# Patient Record
Sex: FEMALE | Race: BLACK OR AFRICAN AMERICAN | Employment: FULL TIME | URBAN - METROPOLITAN AREA
[De-identification: names, ages, dates, MRNs, and addresses within clinical notes are randomized per-mention and may not be internally consistent; named-entity substitution may affect disease eponyms.]

---

## 2021-06-23 ENCOUNTER — INITIAL PRENATAL (OUTPATIENT)
Dept: OBGYN CLINIC | Facility: CLINIC | Age: 26
End: 2021-06-23
Payer: MEDICAID

## 2021-06-23 VITALS
DIASTOLIC BLOOD PRESSURE: 72 MMHG | HEIGHT: 67 IN | HEART RATE: 85 BPM | WEIGHT: 214 LBS | SYSTOLIC BLOOD PRESSURE: 108 MMHG | BODY MASS INDEX: 33.59 KG/M2

## 2021-06-23 DIAGNOSIS — O36.80X0 PREGNANCY WITH UNCERTAIN FETAL VIABILITY, SINGLE OR UNSPECIFIED FETUS: ICD-10-CM

## 2021-06-23 DIAGNOSIS — O36.80X0 PREGNANCY WITH INCONCLUSIVE FETAL VIABILITY, SINGLE OR UNSPECIFIED FETUS: Primary | ICD-10-CM

## 2021-06-23 DIAGNOSIS — Z86.718 HISTORY OF DVT (DEEP VEIN THROMBOSIS): ICD-10-CM

## 2021-06-23 DIAGNOSIS — Z34.01 ENCOUNTER FOR SUPERVISION OF NORMAL FIRST PREGNANCY IN FIRST TRIMESTER: Primary | ICD-10-CM

## 2021-06-23 PROCEDURE — 87591 N.GONORRHOEAE DNA AMP PROB: CPT | Performed by: OBSTETRICS & GYNECOLOGY

## 2021-06-23 PROCEDURE — 3078F DIAST BP <80 MM HG: CPT | Performed by: OBSTETRICS & GYNECOLOGY

## 2021-06-23 PROCEDURE — 87086 URINE CULTURE/COLONY COUNT: CPT | Performed by: OBSTETRICS & GYNECOLOGY

## 2021-06-23 PROCEDURE — 87491 CHLMYD TRACH DNA AMP PROBE: CPT | Performed by: OBSTETRICS & GYNECOLOGY

## 2021-06-23 PROCEDURE — 3008F BODY MASS INDEX DOCD: CPT | Performed by: OBSTETRICS & GYNECOLOGY

## 2021-06-23 PROCEDURE — 0500F INITIAL PRENATAL CARE VISIT: CPT | Performed by: OBSTETRICS & GYNECOLOGY

## 2021-06-23 PROCEDURE — 3074F SYST BP LT 130 MM HG: CPT | Performed by: OBSTETRICS & GYNECOLOGY

## 2021-06-23 RX ORDER — ENOXAPARIN SODIUM 100 MG/ML
40 INJECTION SUBCUTANEOUS DAILY
Qty: 30 EACH | Refills: 2 | Status: SHIPPED | OUTPATIENT
Start: 2021-06-23 | End: 2021-12-22

## 2021-06-23 NOTE — PROGRESS NOTES
Patient is here for her first OB appointment. She is a primigravida. No history of miscarriages. She had ultrasound done at outside facility which confirmed viability. Pictures did not show patient's name. We will have her repeat ultrasound here.   Tori Chavez

## 2021-06-25 NOTE — PROGRESS NOTES
Sent My Chart message with normal result. Farzaneh Velásquez,    Your chlamydia testing was negative.  Because of the sensitive nature of these tests, they are not results you will see in your MyChart results, but you are welcome to ask for a paper copy at your n

## 2021-07-24 ENCOUNTER — ROUTINE PRENATAL (OUTPATIENT)
Dept: OBGYN CLINIC | Facility: CLINIC | Age: 26
End: 2021-07-24
Payer: MEDICAID

## 2021-07-24 VITALS
HEIGHT: 67 IN | WEIGHT: 210 LBS | SYSTOLIC BLOOD PRESSURE: 110 MMHG | BODY MASS INDEX: 32.96 KG/M2 | DIASTOLIC BLOOD PRESSURE: 68 MMHG | HEART RATE: 72 BPM

## 2021-07-24 DIAGNOSIS — Z3A.14 14 WEEKS GESTATION OF PREGNANCY: Primary | ICD-10-CM

## 2021-07-24 DIAGNOSIS — Z86.718 HISTORY OF DVT (DEEP VEIN THROMBOSIS): ICD-10-CM

## 2021-07-24 LAB
GLUCOSE (URINE DIPSTICK): NEGATIVE MG/DL
MULTISTIX LOT#: NORMAL NUMERIC
PROTEIN (URINE DIPSTICK): NEGATIVE MG/DL

## 2021-07-24 PROCEDURE — 0502F SUBSEQUENT PRENATAL CARE: CPT | Performed by: OBSTETRICS & GYNECOLOGY

## 2021-07-24 PROCEDURE — 3078F DIAST BP <80 MM HG: CPT | Performed by: OBSTETRICS & GYNECOLOGY

## 2021-07-24 PROCEDURE — 81002 URINALYSIS NONAUTO W/O SCOPE: CPT | Performed by: OBSTETRICS & GYNECOLOGY

## 2021-07-24 PROCEDURE — 3008F BODY MASS INDEX DOCD: CPT | Performed by: OBSTETRICS & GYNECOLOGY

## 2021-07-24 PROCEDURE — 3074F SYST BP LT 130 MM HG: CPT | Performed by: OBSTETRICS & GYNECOLOGY

## 2021-07-24 NOTE — PROGRESS NOTES
On lovenox. Wants NIPT and carrier. To return on Monday when nurse available to draw blood. Will have 18 to 20-week ultrasound done with MFM. Mayito Spicer  Referral placed

## 2021-07-26 ENCOUNTER — TELEPHONE (OUTPATIENT)
Dept: OBGYN CLINIC | Facility: CLINIC | Age: 26
End: 2021-07-26

## 2021-07-26 ENCOUNTER — DOCUMENTATION ONLY (OUTPATIENT)
Dept: OBGYN CLINIC | Facility: CLINIC | Age: 26
End: 2021-07-26

## 2021-07-26 NOTE — PROGRESS NOTES
Pt was here for a genetic screening blood test.  At the time of her blood draw she indicated she was having nausea and vomitting since the beginning of her pregnancy.   After conferring with Dr. Shakeel Dutta, patient was given information regarding the use of Uniso

## 2021-07-26 NOTE — TELEPHONE ENCOUNTER
[facility] patient's blood. Patient confirmed that the name and  on all 3 tubes were correct.

## 2021-07-29 ENCOUNTER — TELEPHONE (OUTPATIENT)
Dept: OBGYN CLINIC | Facility: CLINIC | Age: 26
End: 2021-07-29

## 2021-07-29 NOTE — TELEPHONE ENCOUNTER
Patient needs PA for appointment with MFM. Routed to clinical supervisor to place PA. Patient notified. Verbalized understanding.  Will call patient once we have approval.

## 2021-08-02 LAB
AMB EXT MYRIAD TRISOMY 13: NEGATIVE
AMB EXT MYRIAD TRISOMY 18: NEGATIVE
AMB EXT MYRIAD TRISOMY 21: NEGATIVE

## 2021-08-02 NOTE — TELEPHONE ENCOUNTER
Spoke with patient, notified that evaluation request and records have been faxed to the [facility]-Dr. Agata Shirley. Advised that patient call tomorrow for appointment. Telephone number and office hours provided to patient.  Patient verbalized

## 2021-08-09 ENCOUNTER — TELEPHONE (OUTPATIENT)
Dept: OBGYN CLINIC | Facility: CLINIC | Age: 26
End: 2021-08-09

## 2021-08-09 NOTE — TELEPHONE ENCOUNTER
Pt aware of negative results. Carlin rayo RN to print and place in envelope at  for . Records abstracted, [place], RN to place in [address] [place] [address] for review and signature.

## 2021-08-10 LAB
AMB EXT CYSTIC FIBROSIS ALLELE 1: NEGATIVE
AMB EXT SICKLE CELL SOLUBILITY: NEGATIVE

## 2021-08-11 NOTE — TELEPHONE ENCOUNTER
Unaware if patient picked up results, not in file in drawer.   Will forward to [facility] for possible answer

## 2021-08-13 ENCOUNTER — TELEPHONE (OUTPATIENT)
Dept: OBGYN CLINIC | Facility: CLINIC | Age: 26
End: 2021-08-13

## 2021-08-13 NOTE — TELEPHONE ENCOUNTER
Carrier screen abstracted. Patient notified of results. Saliva kit order entered and sent to . Placed in providers box for review.

## 2021-08-21 ENCOUNTER — ROUTINE PRENATAL (OUTPATIENT)
Dept: OBGYN CLINIC | Facility: CLINIC | Age: 26
End: 2021-08-21
Payer: MEDICAID

## 2021-08-21 VITALS
HEIGHT: 67 IN | BODY MASS INDEX: 32.8 KG/M2 | DIASTOLIC BLOOD PRESSURE: 52 MMHG | HEART RATE: 105 BPM | SYSTOLIC BLOOD PRESSURE: 104 MMHG | WEIGHT: 209 LBS

## 2021-08-21 DIAGNOSIS — Z34.02 ENCOUNTER FOR SUPERVISION OF NORMAL FIRST PREGNANCY IN SECOND TRIMESTER: Primary | ICD-10-CM

## 2021-08-21 DIAGNOSIS — Z36.9 ENCOUNTER FOR ANTENATAL SCREENING OF MOTHER: ICD-10-CM

## 2021-08-21 LAB
GLUCOSE (URINE DIPSTICK): NEGATIVE MG/DL
MULTISTIX LOT#: ABNORMAL NUMERIC
PROTEIN (URINE DIPSTICK): 30 MG/DL

## 2021-08-21 PROCEDURE — 3008F BODY MASS INDEX DOCD: CPT | Performed by: OBSTETRICS & GYNECOLOGY

## 2021-08-21 PROCEDURE — 3074F SYST BP LT 130 MM HG: CPT | Performed by: OBSTETRICS & GYNECOLOGY

## 2021-08-21 PROCEDURE — 3078F DIAST BP <80 MM HG: CPT | Performed by: OBSTETRICS & GYNECOLOGY

## 2021-08-21 PROCEDURE — 81002 URINALYSIS NONAUTO W/O SCOPE: CPT | Performed by: OBSTETRICS & GYNECOLOGY

## 2021-08-21 PROCEDURE — 0502F SUBSEQUENT PRENATAL CARE: CPT | Performed by: OBSTETRICS & GYNECOLOGY

## 2021-08-21 NOTE — PROGRESS NOTES
Has appointment to see MFM early September. Taking Lovenox. Patient has not had blood work done yet. Encouraged to get her first OB panel ASAP. Return to office in 4 weeks.

## 2021-08-30 ENCOUNTER — TELEPHONE (OUTPATIENT)
Dept: OBGYN CLINIC | Facility: CLINIC | Age: 26
End: 2021-08-30

## 2021-08-30 NOTE — TELEPHONE ENCOUNTER
Spoke with patient, information give for Dr. Ashu Ellsworth with [address] [place] [address]. Patient verbalized understanding.

## 2021-08-30 NOTE — TELEPHONE ENCOUNTER
Patient stated she was told to go to [facility] for Angelique Snider. Not sure where she should go.  Please calll

## 2021-08-31 ENCOUNTER — LAB ENCOUNTER (OUTPATIENT)
Dept: LAB | Age: 26
End: 2021-08-31
Attending: OBSTETRICS & GYNECOLOGY
Payer: MEDICAID

## 2021-08-31 DIAGNOSIS — O36.80X0 PREGNANCY WITH UNCERTAIN FETAL VIABILITY, SINGLE OR UNSPECIFIED FETUS: ICD-10-CM

## 2021-08-31 LAB
ANTIBODY SCREEN: NEGATIVE
BASOPHILS # BLD AUTO: 0.03 X10(3) UL (ref 0–0.2)
BASOPHILS NFR BLD AUTO: 0.3 %
EOSINOPHIL # BLD AUTO: 0.14 X10(3) UL (ref 0–0.7)
EOSINOPHIL NFR BLD AUTO: 1.5 %
ERYTHROCYTE [DISTWIDTH] IN BLOOD BY AUTOMATED COUNT: 14.9 %
HBV SURFACE AG SER-ACNC: <0.1 [IU]/L
HBV SURFACE AG SERPL QL IA: NONREACTIVE
HCT VFR BLD AUTO: 31.5 %
HGB BLD-MCNC: 10 G/DL
IMM GRANULOCYTES # BLD AUTO: 0.05 X10(3) UL (ref 0–1)
IMM GRANULOCYTES NFR BLD: 0.5 %
LYMPHOCYTES # BLD AUTO: 2.44 X10(3) UL (ref 1–4)
LYMPHOCYTES NFR BLD AUTO: 25.7 %
MCH RBC QN AUTO: 30.3 PG (ref 26–34)
MCHC RBC AUTO-ENTMCNC: 31.7 G/DL (ref 31–37)
MCV RBC AUTO: 95.5 FL
MONOCYTES # BLD AUTO: 0.56 X10(3) UL (ref 0.1–1)
MONOCYTES NFR BLD AUTO: 5.9 %
NEUTROPHILS # BLD AUTO: 6.26 X10 (3) UL (ref 1.5–7.7)
NEUTROPHILS # BLD AUTO: 6.26 X10(3) UL (ref 1.5–7.7)
NEUTROPHILS NFR BLD AUTO: 66.1 %
PLATELET # BLD AUTO: 251 10(3)UL (ref 150–450)
RBC # BLD AUTO: 3.3 X10(6)UL
RH BLOOD TYPE: POSITIVE
RUBV IGG SER QL: POSITIVE
RUBV IGG SER-ACNC: 58.8 IU/ML (ref 10–?)
T PALLIDUM AB SER QL IA: NONREACTIVE
WBC # BLD AUTO: 9.5 X10(3) UL (ref 4–11)

## 2021-08-31 PROCEDURE — 86900 BLOOD TYPING SEROLOGIC ABO: CPT

## 2021-08-31 PROCEDURE — 87389 HIV-1 AG W/HIV-1&-2 AB AG IA: CPT

## 2021-08-31 PROCEDURE — 86901 BLOOD TYPING SEROLOGIC RH(D): CPT

## 2021-08-31 PROCEDURE — 85025 COMPLETE CBC W/AUTO DIFF WBC: CPT

## 2021-08-31 PROCEDURE — 36415 COLL VENOUS BLD VENIPUNCTURE: CPT

## 2021-08-31 PROCEDURE — 86762 RUBELLA ANTIBODY: CPT

## 2021-08-31 PROCEDURE — 86780 TREPONEMA PALLIDUM: CPT

## 2021-08-31 PROCEDURE — 87340 HEPATITIS B SURFACE AG IA: CPT

## 2021-08-31 PROCEDURE — 86850 RBC ANTIBODY SCREEN: CPT

## 2021-09-04 NOTE — PROGRESS NOTES
Farzaneh Zafar Eye,  Your test results are normal except you are anemic. Make sure you are taking prenatal vitamin containing iron.  Add in extra oral iron daily at least 4 hours apart from prenatal vitamin (can buy over the counter)    Please call the office with

## 2021-09-08 NOTE — PROGRESS NOTES
09/02/2021-Detailed Anatomy Ultrasound:  Normal Level 2 US  Fetal measurements are consistent with assigned gestational age  Fetal activity is normal  There are no gross fetal anomalies seen today  There are no ultrasound markers of aneuploidy detected  Ma

## 2021-09-18 ENCOUNTER — ROUTINE PRENATAL (OUTPATIENT)
Dept: OBGYN CLINIC | Facility: CLINIC | Age: 26
End: 2021-09-18
Payer: MEDICAID

## 2021-09-18 VITALS
WEIGHT: 217 LBS | DIASTOLIC BLOOD PRESSURE: 62 MMHG | SYSTOLIC BLOOD PRESSURE: 112 MMHG | HEIGHT: 67 IN | BODY MASS INDEX: 34.06 KG/M2 | HEART RATE: 116 BPM

## 2021-09-18 DIAGNOSIS — Z34.02 ENCOUNTER FOR SUPERVISION OF NORMAL FIRST PREGNANCY IN SECOND TRIMESTER: Primary | ICD-10-CM

## 2021-09-18 PROCEDURE — 81002 URINALYSIS NONAUTO W/O SCOPE: CPT | Performed by: OBSTETRICS & GYNECOLOGY

## 2021-09-18 PROCEDURE — 3074F SYST BP LT 130 MM HG: CPT | Performed by: OBSTETRICS & GYNECOLOGY

## 2021-09-18 PROCEDURE — 3078F DIAST BP <80 MM HG: CPT | Performed by: OBSTETRICS & GYNECOLOGY

## 2021-09-18 PROCEDURE — 3008F BODY MASS INDEX DOCD: CPT | Performed by: OBSTETRICS & GYNECOLOGY

## 2021-09-18 PROCEDURE — 0502F SUBSEQUENT PRENATAL CARE: CPT | Performed by: OBSTETRICS & GYNECOLOGY

## 2021-09-18 NOTE — PROGRESS NOTES
Fetus is moving. Glucola and CBC at 24 to 28 weeks. On Lovenox. Getting pain at injection site.   Return to office in 4 weeks

## 2021-09-23 ENCOUNTER — TELEPHONE (OUTPATIENT)
Dept: OBGYN CLINIC | Facility: CLINIC | Age: 26
End: 2021-09-23

## 2021-09-23 ENCOUNTER — HOSPITAL ENCOUNTER (OUTPATIENT)
Facility: HOSPITAL | Age: 26
Setting detail: OBSERVATION
Discharge: HOME | End: 2021-09-23
Attending: OBSTETRICS & GYNECOLOGY | Admitting: OBSTETRICS & GYNECOLOGY
Payer: MEDICAID

## 2021-09-23 VITALS
BODY MASS INDEX: 37.05 KG/M2 | TEMPERATURE: 98 F | RESPIRATION RATE: 16 BRPM | DIASTOLIC BLOOD PRESSURE: 57 MMHG | WEIGHT: 217 LBS | SYSTOLIC BLOOD PRESSURE: 117 MMHG | HEART RATE: 85 BPM | HEIGHT: 64 IN

## 2021-09-23 PROBLEM — Z34.90 PREGNANCY: Status: ACTIVE | Noted: 2021-09-23

## 2021-09-23 LAB
BILIRUBIN URINE: NEGATIVE
CONTROL RUN WITHIN 24 HOURS?: YES
GLUCOSE URINE: NEGATIVE
KETONE URINE: 15
LEUKOCYTE ESTERASE URINE: NEGATIVE
NITRITE URINE: NEGATIVE
PH URINE: 6 (ref 5–8)
PROTEIN URINE: NEGATIVE
SPEC GRAVITY: 1.02 (ref 1–1.03)
URINE CLARITY: CLEAR
URINE COLOR: YELLOW
UROBILINOGEN URINE: 0.2

## 2021-09-23 PROCEDURE — 99213 OFFICE O/P EST LOW 20 MIN: CPT

## 2021-09-23 PROCEDURE — 81002 URINALYSIS NONAUTO W/O SCOPE: CPT

## 2021-09-23 RX ORDER — FERROUS SULFATE 300 MG/5ML
300 LIQUID (ML) ORAL DAILY
Status: ON HOLD | COMMUNITY
End: 2022-01-22 | Stop reason: CLARIF

## 2021-09-23 NOTE — TELEPHONE ENCOUNTER
Patient calling to report vaginal pressure x2 days and contractions. Patient declines vaginal bleeding or leaking of fluid. Patient instructed to go to Labor and Delivery for evaluation. Patient verbalizes understanding.

## 2021-09-23 NOTE — PROGRESS NOTES
Pt here with complaints of lower abdominal pain for past 2 days rating the discomfort from 3-7 at different times. Pt appears comfortable ambulating to room. Triage process explained and pt verbalized understanding.

## 2021-09-23 NOTE — PROGRESS NOTES
Pt given verbal and written discharge instructions and verbalized understanding. Pt home ambulatory in no apparent discomfort after pitcher of water was given.

## 2021-09-23 NOTE — NST
Nonstress Test   Patient: Denia Jordan    Gestation: 23w0d    NST:       Variability: (!) Minimal                                    Baseline: 150 BPM                       Contractions: Not present                                                    N

## 2021-10-02 ENCOUNTER — LAB ENCOUNTER (OUTPATIENT)
Dept: LAB | Facility: HOSPITAL | Age: 26
End: 2021-10-02
Attending: OBSTETRICS & GYNECOLOGY
Payer: MEDICAID

## 2021-10-02 DIAGNOSIS — Z34.02 ENCOUNTER FOR SUPERVISION OF NORMAL FIRST PREGNANCY IN SECOND TRIMESTER: ICD-10-CM

## 2021-10-02 PROCEDURE — 85025 COMPLETE CBC W/AUTO DIFF WBC: CPT

## 2021-10-02 PROCEDURE — 36415 COLL VENOUS BLD VENIPUNCTURE: CPT

## 2021-10-02 PROCEDURE — 82950 GLUCOSE TEST: CPT

## 2021-10-04 NOTE — PROGRESS NOTES
Patient aware of blood test results and recommendation to start 1-2 oral iron tablets/day, 4 hours apart from her prenatal vitamin and to also get a stool softener due to increased risk of constipation with the iron. Patient verbalizes understanding.

## 2021-10-14 ENCOUNTER — TELEPHONE (OUTPATIENT)
Dept: OBGYN CLINIC | Facility: CLINIC | Age: 26
End: 2021-10-14

## 2021-10-14 ENCOUNTER — ROUTINE PRENATAL (OUTPATIENT)
Dept: OBGYN CLINIC | Facility: CLINIC | Age: 26
End: 2021-10-14
Payer: MEDICAID

## 2021-10-14 VITALS
BODY MASS INDEX: 34.37 KG/M2 | HEIGHT: 67 IN | DIASTOLIC BLOOD PRESSURE: 63 MMHG | SYSTOLIC BLOOD PRESSURE: 110 MMHG | WEIGHT: 219 LBS | HEART RATE: 79 BPM

## 2021-10-14 DIAGNOSIS — Z34.03 ENCOUNTER FOR SUPERVISION OF NORMAL FIRST PREGNANCY IN THIRD TRIMESTER: Primary | ICD-10-CM

## 2021-10-14 PROCEDURE — 3074F SYST BP LT 130 MM HG: CPT | Performed by: OBSTETRICS & GYNECOLOGY

## 2021-10-14 PROCEDURE — 0502F SUBSEQUENT PRENATAL CARE: CPT | Performed by: OBSTETRICS & GYNECOLOGY

## 2021-10-14 PROCEDURE — 3008F BODY MASS INDEX DOCD: CPT | Performed by: OBSTETRICS & GYNECOLOGY

## 2021-10-14 PROCEDURE — 3078F DIAST BP <80 MM HG: CPT | Performed by: OBSTETRICS & GYNECOLOGY

## 2021-10-22 ENCOUNTER — TELEPHONE (OUTPATIENT)
Dept: OBGYN CLINIC | Facility: CLINIC | Age: 26
End: 2021-10-22

## 2021-10-22 NOTE — TELEPHONE ENCOUNTER
Per [employer] pt needs quantity limit PA, will fax form. Pt advised we will get PA going. Understanding verbalized.

## 2021-10-25 NOTE — TELEPHONE ENCOUNTER
PA Approved  Case # [id number]  Effective 8/1/21 until 10/25/2022    LM for patient to contact pharmacy for medication

## 2021-10-25 NOTE — TELEPHONE ENCOUNTER
Fax received from [employer] to verify patient's last name, listed with insurance company as [employer]. Spoke to patient, \"Deven\" is her  name. Paperwork re-faxed to [employer].

## 2021-10-29 NOTE — TELEPHONE ENCOUNTER
Copy of [facility] paperwork provided to patient and faxed to [id number] per patient's request.

## 2021-10-29 NOTE — TELEPHONE ENCOUNTER
FMLA paperwork signed per provider, LM for patient to call office back.  Patient needs to provide fax number and sign consent so we can fax paperwork

## 2021-11-03 ENCOUNTER — ROUTINE PRENATAL (OUTPATIENT)
Dept: OBGYN CLINIC | Facility: CLINIC | Age: 26
End: 2021-11-03
Payer: MEDICAID

## 2021-11-03 VITALS
WEIGHT: 220 LBS | HEART RATE: 74 BPM | SYSTOLIC BLOOD PRESSURE: 124 MMHG | DIASTOLIC BLOOD PRESSURE: 78 MMHG | HEIGHT: 67 IN | BODY MASS INDEX: 34.53 KG/M2

## 2021-11-03 DIAGNOSIS — Z3A.28 28 WEEKS GESTATION OF PREGNANCY: ICD-10-CM

## 2021-11-03 DIAGNOSIS — Z86.718 HISTORY OF DVT (DEEP VEIN THROMBOSIS): ICD-10-CM

## 2021-11-03 DIAGNOSIS — Z34.02 ENCOUNTER FOR SUPERVISION OF NORMAL FIRST PREGNANCY IN SECOND TRIMESTER: Primary | ICD-10-CM

## 2021-11-03 PROCEDURE — 3008F BODY MASS INDEX DOCD: CPT | Performed by: OBSTETRICS & GYNECOLOGY

## 2021-11-03 PROCEDURE — 90715 TDAP VACCINE 7 YRS/> IM: CPT | Performed by: OBSTETRICS & GYNECOLOGY

## 2021-11-03 PROCEDURE — 3074F SYST BP LT 130 MM HG: CPT | Performed by: OBSTETRICS & GYNECOLOGY

## 2021-11-03 PROCEDURE — 3078F DIAST BP <80 MM HG: CPT | Performed by: OBSTETRICS & GYNECOLOGY

## 2021-11-03 PROCEDURE — 90471 IMMUNIZATION ADMIN: CPT | Performed by: OBSTETRICS & GYNECOLOGY

## 2021-11-03 PROCEDURE — 0502F SUBSEQUENT PRENATAL CARE: CPT | Performed by: OBSTETRICS & GYNECOLOGY

## 2021-11-03 NOTE — PROGRESS NOTES
Hx of DVT. Jehovah witness. On Lovenox.   Had structural anatomy survey ultrasound with M at [facility]

## 2021-11-20 ENCOUNTER — ROUTINE PRENATAL (OUTPATIENT)
Dept: OBGYN CLINIC | Facility: CLINIC | Age: 26
End: 2021-11-20
Payer: MEDICAID

## 2021-11-20 VITALS
HEIGHT: 67 IN | HEART RATE: 88 BPM | BODY MASS INDEX: 34.53 KG/M2 | DIASTOLIC BLOOD PRESSURE: 75 MMHG | SYSTOLIC BLOOD PRESSURE: 118 MMHG | WEIGHT: 220 LBS

## 2021-11-20 DIAGNOSIS — O99.013 ANEMIA DURING PREGNANCY IN THIRD TRIMESTER: ICD-10-CM

## 2021-11-20 DIAGNOSIS — Z34.03 ENCOUNTER FOR SUPERVISION OF NORMAL FIRST PREGNANCY IN THIRD TRIMESTER: Primary | ICD-10-CM

## 2021-11-20 PROCEDURE — 3078F DIAST BP <80 MM HG: CPT | Performed by: OBSTETRICS & GYNECOLOGY

## 2021-11-20 PROCEDURE — 3074F SYST BP LT 130 MM HG: CPT | Performed by: OBSTETRICS & GYNECOLOGY

## 2021-11-20 PROCEDURE — 81002 URINALYSIS NONAUTO W/O SCOPE: CPT | Performed by: OBSTETRICS & GYNECOLOGY

## 2021-11-20 PROCEDURE — 3008F BODY MASS INDEX DOCD: CPT | Performed by: OBSTETRICS & GYNECOLOGY

## 2021-11-20 PROCEDURE — 0502F SUBSEQUENT PRENATAL CARE: CPT | Performed by: OBSTETRICS & GYNECOLOGY

## 2021-11-20 NOTE — PROGRESS NOTES
On Lovenox. Fetus active. Return to office in 2 weeks. Ova witness. Anemic. Taking iron. Increase to 2/day. Will repeat CBC if still anemic will do IV iron. Desire circumcision. Tdap.

## 2021-11-24 ENCOUNTER — LAB ENCOUNTER (OUTPATIENT)
Dept: LAB | Age: 26
End: 2021-11-24
Attending: OBSTETRICS & GYNECOLOGY
Payer: MEDICAID

## 2021-11-24 DIAGNOSIS — Z3A.28 28 WEEKS GESTATION OF PREGNANCY: ICD-10-CM

## 2021-11-24 DIAGNOSIS — O99.013 ANEMIA DURING PREGNANCY IN THIRD TRIMESTER: ICD-10-CM

## 2021-11-24 PROCEDURE — 87389 HIV-1 AG W/HIV-1&-2 AB AG IA: CPT

## 2021-11-24 PROCEDURE — 85025 COMPLETE CBC W/AUTO DIFF WBC: CPT

## 2021-11-26 NOTE — PROGRESS NOTES
Pt aware of results & recommendations. Advised can take Colace for constipation & drink plenty of water. Verbalized understanding.

## 2021-11-30 ENCOUNTER — TELEPHONE (OUTPATIENT)
Dept: OBGYN CLINIC | Facility: CLINIC | Age: 26
End: 2021-11-30

## 2021-11-30 NOTE — TELEPHONE ENCOUNTER
Patient is pregnant and needs to have dental xrays done .   Is this okay, and if it is, she needs a note for the dentist

## 2021-11-30 NOTE — TELEPHONE ENCOUNTER
I spoke with pt. I let her know she is ok to have dental x-rays done as long as her abdomin is double shielded. Letter sent to her Mychart to take to her dentist. Verbalized understanding.

## 2021-12-04 ENCOUNTER — ROUTINE PRENATAL (OUTPATIENT)
Dept: OBGYN CLINIC | Facility: CLINIC | Age: 26
End: 2021-12-04
Payer: MEDICAID

## 2021-12-04 VITALS
HEIGHT: 67 IN | HEART RATE: 94 BPM | SYSTOLIC BLOOD PRESSURE: 124 MMHG | BODY MASS INDEX: 35.06 KG/M2 | WEIGHT: 223.38 LBS | DIASTOLIC BLOOD PRESSURE: 75 MMHG

## 2021-12-04 DIAGNOSIS — Z34.03 ENCOUNTER FOR SUPERVISION OF NORMAL FIRST PREGNANCY IN THIRD TRIMESTER: Primary | ICD-10-CM

## 2021-12-04 PROCEDURE — 81002 URINALYSIS NONAUTO W/O SCOPE: CPT | Performed by: OBSTETRICS & GYNECOLOGY

## 2021-12-04 PROCEDURE — 3008F BODY MASS INDEX DOCD: CPT | Performed by: OBSTETRICS & GYNECOLOGY

## 2021-12-04 PROCEDURE — 3074F SYST BP LT 130 MM HG: CPT | Performed by: OBSTETRICS & GYNECOLOGY

## 2021-12-04 PROCEDURE — 3078F DIAST BP <80 MM HG: CPT | Performed by: OBSTETRICS & GYNECOLOGY

## 2021-12-04 PROCEDURE — 0502F SUBSEQUENT PRENATAL CARE: CPT | Performed by: OBSTETRICS & GYNECOLOGY

## 2021-12-04 NOTE — PROGRESS NOTES
Had ultrasound done at outside facility on November 7 and showed baby cephalic and a average in size at 3 pounds 6 ounces. Fetus active. Jehovah witness. On Lovenox. Will switch to heparin at 36 weeks.   Return to office in 2 weeks

## 2021-12-18 ENCOUNTER — ROUTINE PRENATAL (OUTPATIENT)
Dept: OBGYN CLINIC | Facility: CLINIC | Age: 26
End: 2021-12-18
Payer: MEDICAID

## 2021-12-18 VITALS
HEART RATE: 90 BPM | BODY MASS INDEX: 35.63 KG/M2 | HEIGHT: 67 IN | DIASTOLIC BLOOD PRESSURE: 71 MMHG | WEIGHT: 227 LBS | SYSTOLIC BLOOD PRESSURE: 110 MMHG

## 2021-12-18 DIAGNOSIS — Z34.03 ENCOUNTER FOR SUPERVISION OF NORMAL FIRST PREGNANCY IN THIRD TRIMESTER: ICD-10-CM

## 2021-12-18 DIAGNOSIS — O99.113 THROMBOPHILIA AFFECTING PREGNANCY IN THIRD TRIMESTER, ANTEPARTUM (HCC): Primary | ICD-10-CM

## 2021-12-18 DIAGNOSIS — D68.59 THROMBOPHILIA AFFECTING PREGNANCY IN THIRD TRIMESTER, ANTEPARTUM (HCC): Primary | ICD-10-CM

## 2021-12-18 PROCEDURE — 3078F DIAST BP <80 MM HG: CPT | Performed by: OBSTETRICS & GYNECOLOGY

## 2021-12-18 PROCEDURE — 3074F SYST BP LT 130 MM HG: CPT | Performed by: OBSTETRICS & GYNECOLOGY

## 2021-12-18 PROCEDURE — 0502F SUBSEQUENT PRENATAL CARE: CPT | Performed by: OBSTETRICS & GYNECOLOGY

## 2021-12-18 PROCEDURE — 81002 URINALYSIS NONAUTO W/O SCOPE: CPT | Performed by: OBSTETRICS & GYNECOLOGY

## 2021-12-18 PROCEDURE — 3008F BODY MASS INDEX DOCD: CPT | Performed by: OBSTETRICS & GYNECOLOGY

## 2021-12-18 NOTE — PROGRESS NOTES
Patient states that she stopped Lovenox on December 8. There was a recall on it and pharmacy would not be able to obtain. Will check on this issue on Monday. Fetus active. NST next week.   Unable to go Lovenox was switched to heparin

## 2021-12-21 RX ORDER — HEPARIN SODIUM 5000 [USP'U]/ML
5000 INJECTION, SOLUTION INTRAVENOUS; SUBCUTANEOUS 2 TIMES DAILY
Qty: 10 ML | Refills: 3 | Status: SHIPPED | OUTPATIENT
Start: 2021-12-21 | End: 2022-01-25

## 2021-12-21 NOTE — TELEPHONE ENCOUNTER
I spoke with pt. She has not been able to get her Lovenox since the beginning of the month due to a recall. She does not have a prescription for Heparin yet. She is to start that at 36 weeks which is in a couple days.  I let pt know I would get a message to

## 2021-12-22 ENCOUNTER — ROUTINE PRENATAL (OUTPATIENT)
Dept: OBGYN CLINIC | Facility: CLINIC | Age: 26
End: 2021-12-22
Payer: MEDICAID

## 2021-12-22 VITALS
BODY MASS INDEX: 35.6 KG/M2 | HEART RATE: 83 BPM | DIASTOLIC BLOOD PRESSURE: 77 MMHG | SYSTOLIC BLOOD PRESSURE: 113 MMHG | HEIGHT: 67 IN | WEIGHT: 226.81 LBS

## 2021-12-22 DIAGNOSIS — Z34.03 ENCOUNTER FOR SUPERVISION OF NORMAL FIRST PREGNANCY IN THIRD TRIMESTER: Primary | ICD-10-CM

## 2021-12-22 DIAGNOSIS — Z36.89 ENCOUNTER FOR ULTRASOUND TO ASSESS INTERVAL GROWTH OF FETUS: ICD-10-CM

## 2021-12-22 PROCEDURE — 3008F BODY MASS INDEX DOCD: CPT | Performed by: OBSTETRICS & GYNECOLOGY

## 2021-12-22 PROCEDURE — 3074F SYST BP LT 130 MM HG: CPT | Performed by: OBSTETRICS & GYNECOLOGY

## 2021-12-22 PROCEDURE — 87653 STREP B DNA AMP PROBE: CPT | Performed by: OBSTETRICS & GYNECOLOGY

## 2021-12-22 PROCEDURE — 81002 URINALYSIS NONAUTO W/O SCOPE: CPT | Performed by: OBSTETRICS & GYNECOLOGY

## 2021-12-22 PROCEDURE — 87081 CULTURE SCREEN ONLY: CPT | Performed by: OBSTETRICS & GYNECOLOGY

## 2021-12-22 PROCEDURE — 0502F SUBSEQUENT PRENATAL CARE: CPT | Performed by: OBSTETRICS & GYNECOLOGY

## 2021-12-22 PROCEDURE — 3078F DIAST BP <80 MM HG: CPT | Performed by: OBSTETRICS & GYNECOLOGY

## 2021-12-22 NOTE — PROGRESS NOTES
OB US for growth. Size> dates. Patient states that she had an ultrasound done on her own unofficially and was told baby was average. We will try to get approval for an ultrasound for growth. Return to office in 1 week.   GBBS done today

## 2021-12-27 NOTE — PROGRESS NOTES
Farzaneh Henderson,    Your recent swab test for group beta strep was positive. There is nothing that you need to do for this at this time, but it does let us know that you will need to have antibiotics when it comes time for labor/delivery.     Here is some mor

## 2021-12-29 ENCOUNTER — ROUTINE PRENATAL (OUTPATIENT)
Dept: OBGYN CLINIC | Facility: CLINIC | Age: 26
End: 2021-12-29
Payer: MEDICAID

## 2021-12-29 VITALS
BODY MASS INDEX: 35.94 KG/M2 | SYSTOLIC BLOOD PRESSURE: 117 MMHG | HEIGHT: 67 IN | WEIGHT: 229 LBS | DIASTOLIC BLOOD PRESSURE: 68 MMHG | HEART RATE: 84 BPM

## 2021-12-29 DIAGNOSIS — Z87.59 HISTORY OF MATERNAL DEEP VEIN THROMBOSIS (DVT): ICD-10-CM

## 2021-12-29 DIAGNOSIS — Z86.718 HISTORY OF MATERNAL DEEP VEIN THROMBOSIS (DVT): ICD-10-CM

## 2021-12-29 DIAGNOSIS — Z34.03 ENCOUNTER FOR SUPERVISION OF NORMAL FIRST PREGNANCY IN THIRD TRIMESTER: Primary | ICD-10-CM

## 2021-12-29 PROCEDURE — 3078F DIAST BP <80 MM HG: CPT | Performed by: OBSTETRICS & GYNECOLOGY

## 2021-12-29 PROCEDURE — 3074F SYST BP LT 130 MM HG: CPT | Performed by: OBSTETRICS & GYNECOLOGY

## 2021-12-29 PROCEDURE — 59025 FETAL NON-STRESS TEST: CPT | Performed by: OBSTETRICS & GYNECOLOGY

## 2021-12-29 PROCEDURE — 0502F SUBSEQUENT PRENATAL CARE: CPT | Performed by: OBSTETRICS & GYNECOLOGY

## 2021-12-29 PROCEDURE — 81002 URINALYSIS NONAUTO W/O SCOPE: CPT | Performed by: OBSTETRICS & GYNECOLOGY

## 2021-12-29 PROCEDURE — 3008F BODY MASS INDEX DOCD: CPT | Performed by: OBSTETRICS & GYNECOLOGY

## 2021-12-29 NOTE — PROGRESS NOTES
Is currently on heparin twice daily. NST today for history of thrombophilia. Return to office in 1 week. Kick count discussed. Has ultrasound scheduled for size greater than dates. . NST reactive

## 2021-12-30 ENCOUNTER — TELEPHONE (OUTPATIENT)
Dept: OBGYN CLINIC | Facility: CLINIC | Age: 26
End: 2021-12-30

## 2022-01-03 ENCOUNTER — ROUTINE PRENATAL (OUTPATIENT)
Dept: OBGYN CLINIC | Facility: CLINIC | Age: 27
End: 2022-01-03
Payer: MEDICAID

## 2022-01-03 VITALS
WEIGHT: 232 LBS | HEART RATE: 105 BPM | DIASTOLIC BLOOD PRESSURE: 76 MMHG | SYSTOLIC BLOOD PRESSURE: 126 MMHG | BODY MASS INDEX: 36.41 KG/M2 | HEIGHT: 67 IN

## 2022-01-03 DIAGNOSIS — Z34.03 ENCOUNTER FOR SUPERVISION OF NORMAL FIRST PREGNANCY IN THIRD TRIMESTER: ICD-10-CM

## 2022-01-03 DIAGNOSIS — Z86.718 PREVIOUS DEEP VEIN THROMBOSIS (DVT) AFFECTING PREGNANCY IN THIRD TRIMESTER: Primary | ICD-10-CM

## 2022-01-03 DIAGNOSIS — O09.893 PREVIOUS DEEP VEIN THROMBOSIS (DVT) AFFECTING PREGNANCY IN THIRD TRIMESTER: Primary | ICD-10-CM

## 2022-01-03 LAB
GLUCOSE (URINE DIPSTICK): NEGATIVE MG/DL
MULTISTIX LOT#: NORMAL NUMERIC

## 2022-01-03 PROCEDURE — 0502F SUBSEQUENT PRENATAL CARE: CPT | Performed by: OBSTETRICS & GYNECOLOGY

## 2022-01-03 PROCEDURE — 59025 FETAL NON-STRESS TEST: CPT | Performed by: OBSTETRICS & GYNECOLOGY

## 2022-01-03 PROCEDURE — 3074F SYST BP LT 130 MM HG: CPT | Performed by: OBSTETRICS & GYNECOLOGY

## 2022-01-03 PROCEDURE — 3008F BODY MASS INDEX DOCD: CPT | Performed by: OBSTETRICS & GYNECOLOGY

## 2022-01-03 PROCEDURE — 3078F DIAST BP <80 MM HG: CPT | Performed by: OBSTETRICS & GYNECOLOGY

## 2022-01-03 PROCEDURE — 81002 URINALYSIS NONAUTO W/O SCOPE: CPT | Performed by: OBSTETRICS & GYNECOLOGY

## 2022-01-03 NOTE — PROGRESS NOTES
NST reactive. No UC. Fetus active. Signs and symptoms of labor discussed. On heparin 5000 twice daily. Jehovah witness. History of DVT. Return to office in 1 week.   Patient states that she had an ultrasound done on her own in beginning of November a

## 2022-01-10 ENCOUNTER — TELEPHONE (OUTPATIENT)
Dept: OBGYN CLINIC | Facility: CLINIC | Age: 27
End: 2022-01-10

## 2022-01-10 ENCOUNTER — ROUTINE PRENATAL (OUTPATIENT)
Dept: OBGYN CLINIC | Facility: CLINIC | Age: 27
End: 2022-01-10
Payer: MEDICAID

## 2022-01-10 VITALS
BODY MASS INDEX: 36.88 KG/M2 | HEIGHT: 67 IN | DIASTOLIC BLOOD PRESSURE: 70 MMHG | WEIGHT: 235 LBS | SYSTOLIC BLOOD PRESSURE: 124 MMHG | HEART RATE: 106 BPM

## 2022-01-10 DIAGNOSIS — Z34.03 ENCOUNTER FOR SUPERVISION OF NORMAL FIRST PREGNANCY IN THIRD TRIMESTER: Primary | ICD-10-CM

## 2022-01-10 DIAGNOSIS — Z86.718 HISTORY OF MATERNAL DEEP VEIN THROMBOSIS (DVT): ICD-10-CM

## 2022-01-10 DIAGNOSIS — Z3A.38 38 WEEKS GESTATION OF PREGNANCY: ICD-10-CM

## 2022-01-10 DIAGNOSIS — Z87.59 HISTORY OF MATERNAL DEEP VEIN THROMBOSIS (DVT): ICD-10-CM

## 2022-01-10 PROCEDURE — 0502F SUBSEQUENT PRENATAL CARE: CPT | Performed by: OBSTETRICS & GYNECOLOGY

## 2022-01-10 PROCEDURE — 3074F SYST BP LT 130 MM HG: CPT | Performed by: OBSTETRICS & GYNECOLOGY

## 2022-01-10 PROCEDURE — 3008F BODY MASS INDEX DOCD: CPT | Performed by: OBSTETRICS & GYNECOLOGY

## 2022-01-10 PROCEDURE — 3078F DIAST BP <80 MM HG: CPT | Performed by: OBSTETRICS & GYNECOLOGY

## 2022-01-10 PROCEDURE — 59025 FETAL NON-STRESS TEST: CPT | Performed by: OBSTETRICS & GYNECOLOGY

## 2022-01-10 PROCEDURE — 81002 URINALYSIS NONAUTO W/O SCOPE: CPT | Performed by: OBSTETRICS & GYNECOLOGY

## 2022-01-10 NOTE — PROGRESS NOTES
No contractions. Fetus active. NST reactive. Cervix is thick closed and high. Has appointment for ultrasound in 2 days.   On heparin for history of DVT

## 2022-01-10 NOTE — TELEPHONE ENCOUNTER
Monday, January 10, 2022 6:38 PM  Summary of Your Request  Please review the details of your request below and if everything looks correct click CONTINUE    Your case has been sent to [employer].      Provider Name: Kevin Snow Contact: Patria Aburto

## 2022-01-11 NOTE — TELEPHONE ENCOUNTER
Member Name: Emeline Felty  YOB: 1995  Subscriber ID: [id number]  Request ID: [id number]  Physician: DR. Randolph Grajeda  Facility/Provider: [facility]  Treatment Setting: Outpatient  Service Procedure Code/Description:  Procedure Description Unit

## 2022-01-12 ENCOUNTER — ULTRASOUND ENCOUNTER (OUTPATIENT)
Dept: OBGYN CLINIC | Facility: CLINIC | Age: 27
End: 2022-01-12
Payer: MEDICAID

## 2022-01-17 ENCOUNTER — ROUTINE PRENATAL (OUTPATIENT)
Dept: OBGYN CLINIC | Facility: CLINIC | Age: 27
End: 2022-01-17
Payer: MEDICAID

## 2022-01-17 VITALS
WEIGHT: 235 LBS | SYSTOLIC BLOOD PRESSURE: 118 MMHG | BODY MASS INDEX: 36.88 KG/M2 | HEART RATE: 101 BPM | DIASTOLIC BLOOD PRESSURE: 75 MMHG | HEIGHT: 67 IN

## 2022-01-17 DIAGNOSIS — O09.893 PREVIOUS DEEP VEIN THROMBOSIS (DVT) AFFECTING PREGNANCY IN THIRD TRIMESTER: Primary | ICD-10-CM

## 2022-01-17 DIAGNOSIS — Z3A.39 39 WEEKS GESTATION OF PREGNANCY: ICD-10-CM

## 2022-01-17 DIAGNOSIS — Z86.718 PREVIOUS DEEP VEIN THROMBOSIS (DVT) AFFECTING PREGNANCY IN THIRD TRIMESTER: Primary | ICD-10-CM

## 2022-01-17 PROCEDURE — 3008F BODY MASS INDEX DOCD: CPT | Performed by: OBSTETRICS & GYNECOLOGY

## 2022-01-17 PROCEDURE — 0502F SUBSEQUENT PRENATAL CARE: CPT | Performed by: OBSTETRICS & GYNECOLOGY

## 2022-01-17 PROCEDURE — 3078F DIAST BP <80 MM HG: CPT | Performed by: OBSTETRICS & GYNECOLOGY

## 2022-01-17 PROCEDURE — 3074F SYST BP LT 130 MM HG: CPT | Performed by: OBSTETRICS & GYNECOLOGY

## 2022-01-17 PROCEDURE — 59025 FETAL NON-STRESS TEST: CPT | Performed by: OBSTETRICS & GYNECOLOGY

## 2022-01-17 NOTE — PROGRESS NOTES
NST is reactive. Cervix is closed thick and floating. Difficult to reach presenting part. Discussed with patient very unfavorable cervix. Will set up induction between 40 to 41 weeks. Due date in 3 days. Repeat NST in 3 days.   History of DVT on hepa

## 2022-01-19 ENCOUNTER — TELEPHONE (OUTPATIENT)
Dept: OBGYN CLINIC | Facility: CLINIC | Age: 27
End: 2022-01-19

## 2022-01-19 NOTE — TELEPHONE ENCOUNTER
I spoke with pt and let her know that her [facility] paper work has been completed. She will pick it up at her next appointment.

## 2022-01-21 ENCOUNTER — TELEPHONE (OUTPATIENT)
Dept: OBGYN CLINIC | Facility: CLINIC | Age: 27
End: 2022-01-21

## 2022-01-21 ENCOUNTER — ROUTINE PRENATAL (OUTPATIENT)
Dept: OBGYN CLINIC | Facility: CLINIC | Age: 27
End: 2022-01-21
Payer: MEDICAID

## 2022-01-21 VITALS
HEART RATE: 86 BPM | SYSTOLIC BLOOD PRESSURE: 122 MMHG | WEIGHT: 237 LBS | BODY MASS INDEX: 37.2 KG/M2 | DIASTOLIC BLOOD PRESSURE: 82 MMHG | HEIGHT: 67 IN

## 2022-01-21 DIAGNOSIS — O48.0 POST-TERM PREGNANCY, 40-42 WEEKS OF GESTATION: Primary | ICD-10-CM

## 2022-01-21 DIAGNOSIS — O47.9 UTERINE CONTRACTIONS DURING PREGNANCY: Primary | ICD-10-CM

## 2022-01-21 DIAGNOSIS — Z36.9 PRENATAL SCREENING ENCOUNTER: ICD-10-CM

## 2022-01-21 PROBLEM — Z22.330 GBS CARRIER: Status: ACTIVE | Noted: 2022-01-21

## 2022-01-21 PROBLEM — O99.213 OBESITY AFFECTING PREGNANCY IN THIRD TRIMESTER: Status: ACTIVE | Noted: 2022-01-21

## 2022-01-21 PROBLEM — Z34.03 PREGNANCY, FIRST, THIRD TRIMESTER: Status: ACTIVE | Noted: 2022-01-21

## 2022-01-21 PROBLEM — Z79.01 ANTICOAGULATED ON HEPARIN: Status: ACTIVE | Noted: 2022-01-21

## 2022-01-21 PROBLEM — O99.013 ANEMIA AFFECTING PREGNANCY IN THIRD TRIMESTER: Status: ACTIVE | Noted: 2022-01-21

## 2022-01-21 PROCEDURE — 0502F SUBSEQUENT PRENATAL CARE: CPT | Performed by: OBSTETRICS & GYNECOLOGY

## 2022-01-21 PROCEDURE — 3008F BODY MASS INDEX DOCD: CPT | Performed by: OBSTETRICS & GYNECOLOGY

## 2022-01-21 PROCEDURE — 3079F DIAST BP 80-89 MM HG: CPT | Performed by: OBSTETRICS & GYNECOLOGY

## 2022-01-21 PROCEDURE — 59025 FETAL NON-STRESS TEST: CPT | Performed by: OBSTETRICS & GYNECOLOGY

## 2022-01-21 PROCEDURE — 3074F SYST BP LT 130 MM HG: CPT | Performed by: OBSTETRICS & GYNECOLOGY

## 2022-01-21 PROCEDURE — 81002 URINALYSIS NONAUTO W/O SCOPE: CPT | Performed by: OBSTETRICS & GYNECOLOGY

## 2022-01-21 NOTE — PROGRESS NOTES
Patient states that baby is very active. NST is reactive. Kick count discussed. Patient to call if there is any decrease in movement. She is scheduled for Cytotec on January 26 with induction on the 27th. Cervix checked closed floating 50% effaced.

## 2022-01-22 ENCOUNTER — HOSPITAL ENCOUNTER (INPATIENT)
Facility: HOSPITAL | Age: 27
LOS: 3 days | Discharge: HOME | End: 2022-01-25
Attending: OBSTETRICS & GYNECOLOGY | Admitting: OBSTETRICS & GYNECOLOGY
Payer: MEDICAID

## 2022-01-22 PROBLEM — Z34.90 PREGNANCY: Status: ACTIVE | Noted: 2022-01-22

## 2022-01-22 LAB
ANTIBODY SCREEN: NEGATIVE
BASOPHILS # BLD AUTO: 0.02 X10(3) UL (ref 0–0.2)
BASOPHILS NFR BLD AUTO: 0.2 %
EOSINOPHIL # BLD AUTO: 0.05 X10(3) UL (ref 0–0.7)
EOSINOPHIL NFR BLD AUTO: 0.5 %
ERYTHROCYTE [DISTWIDTH] IN BLOOD BY AUTOMATED COUNT: 14.6 %
HCT VFR BLD AUTO: 32.3 %
HGB BLD-MCNC: 11 G/DL
IMM GRANULOCYTES # BLD AUTO: 0.04 X10(3) UL (ref 0–1)
IMM GRANULOCYTES NFR BLD: 0.4 %
LYMPHOCYTES # BLD AUTO: 2.01 X10(3) UL (ref 1–4)
LYMPHOCYTES NFR BLD AUTO: 22 %
MCH RBC QN AUTO: 31.3 PG (ref 26–34)
MCHC RBC AUTO-ENTMCNC: 34.1 G/DL (ref 31–37)
MCV RBC AUTO: 91.8 FL
MONOCYTES # BLD AUTO: 1.07 X10(3) UL (ref 0.1–1)
MONOCYTES NFR BLD AUTO: 11.7 %
NEUTROPHILS # BLD AUTO: 5.93 X10 (3) UL (ref 1.5–7.7)
NEUTROPHILS # BLD AUTO: 5.93 X10(3) UL (ref 1.5–7.7)
NEUTROPHILS NFR BLD AUTO: 65.2 %
PLATELET # BLD AUTO: 181 10(3)UL (ref 150–450)
RBC # BLD AUTO: 3.52 X10(6)UL
RH BLOOD TYPE: POSITIVE
SARS-COV-2 RNA RESP QL NAA+PROBE: NOT DETECTED
T PALLIDUM AB SER QL IA: NONREACTIVE
WBC # BLD AUTO: 9.1 X10(3) UL (ref 4–11)

## 2022-01-22 RX ORDER — ONDANSETRON 2 MG/ML
4 INJECTION INTRAMUSCULAR; INTRAVENOUS EVERY 6 HOURS PRN
Status: DISCONTINUED | OUTPATIENT
Start: 2022-01-22 | End: 2022-01-23 | Stop reason: HOSPADM

## 2022-01-22 RX ORDER — TERBUTALINE SULFATE 1 MG/ML
0.25 INJECTION, SOLUTION SUBCUTANEOUS AS NEEDED
Status: DISCONTINUED | OUTPATIENT
Start: 2022-01-22 | End: 2022-01-23 | Stop reason: HOSPADM

## 2022-01-22 RX ORDER — HYDROMORPHONE HYDROCHLORIDE 1 MG/ML
2 INJECTION, SOLUTION INTRAMUSCULAR; INTRAVENOUS; SUBCUTANEOUS EVERY 2 HOUR PRN
Status: DISCONTINUED | OUTPATIENT
Start: 2022-01-22 | End: 2022-01-23

## 2022-01-22 RX ORDER — DEXTROSE, SODIUM CHLORIDE, SODIUM LACTATE, POTASSIUM CHLORIDE, AND CALCIUM CHLORIDE 5; .6; .31; .03; .02 G/100ML; G/100ML; G/100ML; G/100ML; G/100ML
INJECTION, SOLUTION INTRAVENOUS AS NEEDED
Status: DISCONTINUED | OUTPATIENT
Start: 2022-01-22 | End: 2022-01-23 | Stop reason: HOSPADM

## 2022-01-22 RX ORDER — SODIUM CHLORIDE, SODIUM LACTATE, POTASSIUM CHLORIDE, CALCIUM CHLORIDE 600; 310; 30; 20 MG/100ML; MG/100ML; MG/100ML; MG/100ML
INJECTION, SOLUTION INTRAVENOUS CONTINUOUS
Status: DISCONTINUED | OUTPATIENT
Start: 2022-01-22 | End: 2022-01-23 | Stop reason: HOSPADM

## 2022-01-22 RX ORDER — AMMONIA INHALANTS 0.04 G/.3ML
0.3 INHALANT RESPIRATORY (INHALATION) AS NEEDED
Status: DISCONTINUED | OUTPATIENT
Start: 2022-01-22 | End: 2022-01-23 | Stop reason: HOSPADM

## 2022-01-22 RX ORDER — TRISODIUM CITRATE DIHYDRATE AND CITRIC ACID MONOHYDRATE 500; 334 MG/5ML; MG/5ML
30 SOLUTION ORAL AS NEEDED
Status: COMPLETED | OUTPATIENT
Start: 2022-01-22 | End: 2022-01-23

## 2022-01-22 RX ORDER — IBUPROFEN 600 MG/1
600 TABLET ORAL EVERY 6 HOURS PRN
Status: DISCONTINUED | OUTPATIENT
Start: 2022-01-22 | End: 2022-01-23 | Stop reason: HOSPADM

## 2022-01-22 RX ORDER — MELATONIN
325
COMMUNITY
End: 2022-01-25

## 2022-01-22 RX ORDER — ACETAMINOPHEN 500 MG
500 TABLET ORAL EVERY 6 HOURS PRN
Status: DISCONTINUED | OUTPATIENT
Start: 2022-01-22 | End: 2022-01-23 | Stop reason: HOSPADM

## 2022-01-22 NOTE — PROGRESS NOTES
Pt is a 31year old female admitted to TRG5/TRG5-A. Pt is  40w2d intra-uterine pregnancy.     Patient presents with:  R/o Labor: Pt began george last night, more intense and frequent this morning since 0900  Pt denies any complications w/ current

## 2022-01-22 NOTE — PROGRESS NOTES
Pt transferred to  113 via ambulatory w/ RN and spouse @ side. Oriented to room, POC discussed. Plan on Cytotec IOL @ 1800.

## 2022-01-22 NOTE — PROGRESS NOTES
Dr Diane Marcos calls this RN for update on pt. This RN informs her pt george every 4-8 min. Pt rating pain at 8/10 and breathing through ctxs at times. Pt was reading last time this RN in room.  Pt has moderate variability with variables and occas late decels

## 2022-01-23 ENCOUNTER — ANESTHESIA (OUTPATIENT)
Dept: OBGYN UNIT | Facility: HOSPITAL | Age: 27
End: 2022-01-23
Payer: MEDICAID

## 2022-01-23 ENCOUNTER — ANESTHESIA EVENT (OUTPATIENT)
Dept: OBGYN UNIT | Facility: HOSPITAL | Age: 27
End: 2022-01-23
Payer: MEDICAID

## 2022-01-23 PROCEDURE — 59514 CESAREAN DELIVERY ONLY: CPT | Performed by: OBSTETRICS & GYNECOLOGY

## 2022-01-23 RX ORDER — DOCUSATE SODIUM 100 MG/1
100 CAPSULE, LIQUID FILLED ORAL
Status: DISCONTINUED | OUTPATIENT
Start: 2022-01-23 | End: 2022-01-25

## 2022-01-23 RX ORDER — KETOROLAC TROMETHAMINE 30 MG/ML
30 INJECTION, SOLUTION INTRAMUSCULAR; INTRAVENOUS EVERY 6 HOURS
Status: ACTIVE | OUTPATIENT
Start: 2022-01-23 | End: 2022-01-24

## 2022-01-23 RX ORDER — GABAPENTIN 300 MG/1
300 CAPSULE ORAL EVERY 8 HOURS PRN
Status: DISCONTINUED | OUTPATIENT
Start: 2022-01-23 | End: 2022-01-25

## 2022-01-23 RX ORDER — SIMETHICONE 80 MG
80 TABLET,CHEWABLE ORAL 3 TIMES DAILY PRN
Status: DISCONTINUED | OUTPATIENT
Start: 2022-01-23 | End: 2022-01-25

## 2022-01-23 RX ORDER — BISACODYL 10 MG
10 SUPPOSITORY, RECTAL RECTAL ONCE AS NEEDED
Status: DISCONTINUED | OUTPATIENT
Start: 2022-01-23 | End: 2022-01-25

## 2022-01-23 RX ORDER — SODIUM CHLORIDE, SODIUM LACTATE, POTASSIUM CHLORIDE, CALCIUM CHLORIDE 600; 310; 30; 20 MG/100ML; MG/100ML; MG/100ML; MG/100ML
INJECTION, SOLUTION INTRAVENOUS CONTINUOUS
Status: DISCONTINUED | OUTPATIENT
Start: 2022-01-23 | End: 2022-01-25

## 2022-01-23 RX ORDER — CEFAZOLIN SODIUM/WATER 2 G/20 ML
2 SYRINGE (ML) INTRAVENOUS ONCE
Status: DISCONTINUED | OUTPATIENT
Start: 2022-01-23 | End: 2022-01-23 | Stop reason: HOSPADM

## 2022-01-23 RX ORDER — PHENYLEPHRINE HCL 10 MG/ML
VIAL (ML) INJECTION AS NEEDED
Status: DISCONTINUED | OUTPATIENT
Start: 2022-01-23 | End: 2022-01-23 | Stop reason: SURG

## 2022-01-23 RX ORDER — BUPIVACAINE HCL/0.9 % NACL/PF 0.25 %
5 PLASTIC BAG, INJECTION (ML) EPIDURAL AS NEEDED
Status: DISCONTINUED | OUTPATIENT
Start: 2022-01-23 | End: 2022-01-23

## 2022-01-23 RX ORDER — IBUPROFEN 600 MG/1
600 TABLET ORAL EVERY 6 HOURS
Status: DISCONTINUED | OUTPATIENT
Start: 2022-01-23 | End: 2022-01-25

## 2022-01-23 RX ORDER — METOCLOPRAMIDE HYDROCHLORIDE 5 MG/ML
INJECTION INTRAMUSCULAR; INTRAVENOUS AS NEEDED
Status: DISCONTINUED | OUTPATIENT
Start: 2022-01-23 | End: 2022-01-23 | Stop reason: SURG

## 2022-01-23 RX ORDER — DEXAMETHASONE SODIUM PHOSPHATE 4 MG/ML
VIAL (ML) INJECTION AS NEEDED
Status: DISCONTINUED | OUTPATIENT
Start: 2022-01-23 | End: 2022-01-23 | Stop reason: SURG

## 2022-01-23 RX ORDER — ONDANSETRON 2 MG/ML
INJECTION INTRAMUSCULAR; INTRAVENOUS AS NEEDED
Status: DISCONTINUED | OUTPATIENT
Start: 2022-01-23 | End: 2022-01-23 | Stop reason: SURG

## 2022-01-23 RX ORDER — NALBUPHINE HCL 10 MG/ML
2.5 AMPUL (ML) INJECTION
Status: DISCONTINUED | OUTPATIENT
Start: 2022-01-23 | End: 2022-01-23

## 2022-01-23 RX ORDER — MORPHINE SULFATE 2 MG/ML
INJECTION, SOLUTION INTRAMUSCULAR; INTRAVENOUS AS NEEDED
Status: DISCONTINUED | OUTPATIENT
Start: 2022-01-23 | End: 2022-01-23 | Stop reason: SURG

## 2022-01-23 RX ORDER — BUPIVACAINE HYDROCHLORIDE 7.5 MG/ML
INJECTION, SOLUTION INTRASPINAL AS NEEDED
Status: DISCONTINUED | OUTPATIENT
Start: 2022-01-23 | End: 2022-01-23 | Stop reason: SURG

## 2022-01-23 RX ORDER — DIPHENHYDRAMINE HYDROCHLORIDE 50 MG/ML
25 INJECTION INTRAMUSCULAR; INTRAVENOUS ONCE AS NEEDED
Status: ACTIVE | OUTPATIENT
Start: 2022-01-23 | End: 2022-01-23

## 2022-01-23 RX ORDER — ACETAMINOPHEN 500 MG
1000 TABLET ORAL EVERY 6 HOURS
Status: DISCONTINUED | OUTPATIENT
Start: 2022-01-23 | End: 2022-01-25

## 2022-01-23 RX ORDER — DEXTROSE, SODIUM CHLORIDE, SODIUM LACTATE, POTASSIUM CHLORIDE, AND CALCIUM CHLORIDE 5; .6; .31; .03; .02 G/100ML; G/100ML; G/100ML; G/100ML; G/100ML
INJECTION, SOLUTION INTRAVENOUS CONTINUOUS PRN
Status: DISCONTINUED | OUTPATIENT
Start: 2022-01-23 | End: 2022-01-25

## 2022-01-23 RX ORDER — ONDANSETRON 2 MG/ML
4 INJECTION INTRAMUSCULAR; INTRAVENOUS ONCE AS NEEDED
Status: ACTIVE | OUTPATIENT
Start: 2022-01-23 | End: 2022-01-23

## 2022-01-23 RX ORDER — ENOXAPARIN SODIUM 100 MG/ML
40 INJECTION SUBCUTANEOUS DAILY
Status: DISCONTINUED | OUTPATIENT
Start: 2022-01-23 | End: 2022-01-25

## 2022-01-23 RX ORDER — NALBUPHINE HCL 10 MG/ML
2.5 AMPUL (ML) INJECTION
Status: DISCONTINUED | OUTPATIENT
Start: 2022-01-23 | End: 2022-01-25

## 2022-01-23 RX ADMIN — SODIUM CHLORIDE, SODIUM LACTATE, POTASSIUM CHLORIDE, CALCIUM CHLORIDE: 600; 310; 30; 20 INJECTION, SOLUTION INTRAVENOUS at 12:04:00

## 2022-01-23 RX ADMIN — METOCLOPRAMIDE HYDROCHLORIDE 10 MG: 5 INJECTION INTRAMUSCULAR; INTRAVENOUS at 12:55:00

## 2022-01-23 RX ADMIN — SODIUM CHLORIDE, SODIUM LACTATE, POTASSIUM CHLORIDE, CALCIUM CHLORIDE: 600; 310; 30; 20 INJECTION, SOLUTION INTRAVENOUS at 13:38:00

## 2022-01-23 RX ADMIN — DEXAMETHASONE SODIUM PHOSPHATE 4 MG: 4 MG/ML VIAL (ML) INJECTION at 12:50:00

## 2022-01-23 RX ADMIN — PHENYLEPHRINE HCL 100 MCG: 10 MG/ML VIAL (ML) INJECTION at 12:36:00

## 2022-01-23 RX ADMIN — PHENYLEPHRINE HCL 100 MCG: 10 MG/ML VIAL (ML) INJECTION at 12:47:00

## 2022-01-23 RX ADMIN — ONDANSETRON 4 MG: 2 INJECTION INTRAMUSCULAR; INTRAVENOUS at 12:50:00

## 2022-01-23 RX ADMIN — MORPHINE SULFATE 0.2 MG: 2 INJECTION, SOLUTION INTRAMUSCULAR; INTRAVENOUS at 12:28:00

## 2022-01-23 RX ADMIN — PHENYLEPHRINE HCL 100 MCG: 10 MG/ML VIAL (ML) INJECTION at 12:57:00

## 2022-01-23 RX ADMIN — PHENYLEPHRINE HCL 100 MCG: 10 MG/ML VIAL (ML) INJECTION at 12:38:00

## 2022-01-23 RX ADMIN — PHENYLEPHRINE HCL 100 MCG: 10 MG/ML VIAL (ML) INJECTION at 12:42:00

## 2022-01-23 RX ADMIN — PHENYLEPHRINE HCL 100 MCG: 10 MG/ML VIAL (ML) INJECTION at 12:35:00

## 2022-01-23 RX ADMIN — PHENYLEPHRINE HCL 100 MCG: 10 MG/ML VIAL (ML) INJECTION at 12:40:00

## 2022-01-23 RX ADMIN — BUPIVACAINE HYDROCHLORIDE 1.4 ML: 7.5 INJECTION, SOLUTION INTRASPINAL at 12:28:00

## 2022-01-23 NOTE — ANESTHESIA PROCEDURE NOTES
Labor Analgesia  Performed by: Akshat Tovar MD  Authorized by: Akshat Tovar MD       General Information and Staff    Start Time:  1/23/2022 2:23 AM  End Time:  1/23/2022 2:32 AM  Anesthesiologist:  Akshat Tovar MD  Performed

## 2022-01-23 NOTE — ANESTHESIA PREPROCEDURE EVALUATION
PRE-OP EVALUATION    Patient Name: Johann Fairbanks    Admit Diagnosis: preg state  Pregnancy    Pre-op Diagnosis: * No pre-op diagnosis entered *        Anesthesia Procedure: LABOR ANALGESIA    * No surgeons found in log *    Pre-op vitals reviewed.   Tem sulfate 325 (65 FE) MG Oral Tab EC, Take 325 mg by mouth daily with breakfast., Disp: , Rfl: , 1/21/2022 at 0800  Heparin Sodium, Porcine, 5000 UNIT/ML Injection Solution, Inject 1 mL (5,000 Units total) into the skin 2 (two) times a day., Disp: 10 mL, Rfl Admission:  **None**

## 2022-01-23 NOTE — ANESTHESIA PROCEDURE NOTES
Spinal Block  Performed by: Taylor Medel DO  Authorized by: Taylor Medel DO       General Information and Staff    Start Time:  1/23/2022 12:00 PM  End Time:  1/23/2022 12:29 PM  Anesthesiologist:  Taylor Medel DO  Performed by:   Anesthesiologist  Site identi

## 2022-01-23 NOTE — H&P
[address] [facility] Patient Status:  Inpatient    1995 MRN [id number]   Location [address] [facility] Attending Korina Kimbrough MD   [facility] Day # 1 PCP Jayme Vaughan MD     Subjective:   Patient is 36 3/

## 2022-01-23 NOTE — PROGRESS NOTES
Received in Mother Baby in stable condition. Oriented to room, bed in low position, call light in reach, side rails up x2. ID bands verified. Hugs and Kisses in use.

## 2022-01-23 NOTE — ANESTHESIA POSTPROCEDURE EVALUATION
[facility]    Lona Godoy Patient Status:  Inpatient   Age/Gender 31year old female MRN [id number]   Location [address] [facility] Attending Darius Rivas, [address] [facility] [address] Day # 1 PCP Vee Mchugh MD       Anesthesia Post-op Note    LUIS

## 2022-01-23 NOTE — OPERATIVE REPORT
[facility]   Section - Operative Note    Jose Johnson Patient Status:  Inpatient    1995 MRN [id number]   Location [address] [facility] Attending Demetrius Weller MD   Hosp Day # 1 PCP Gwen Avelar MD       Preoperative was delivered intact with a 3 vessel cord. Uterus, tubes and ovaries were normal in appearance. The uterine cavity was swept clean using a wet lap. The cervix was dilated with a ring forcep which was then passed off of the field.   The first layer of the

## 2022-01-23 NOTE — PLAN OF CARE
Problem: POSTPARTUM  Goal: Long Term Goal:Experiences normal postpartum course  Description: INTERVENTIONS:  - Assess and monitor vital signs and lab values. - Assess fundus and lochia. - Provide ice/sitz baths for perineum discomfort.   - Monitor heali pain/trauma. - Instruct and provide assistance with proper latch. - Review techniques for milk expression (breast pumping) and storage of breast milk. Provide pumping equipment/supplies, instructions and assistance, as needed.   - Encourage rooming-in and Obtain nutritional consult as needed  - Establish a toileting routine/schedule  - Consider collaborating with pharmacy to review patient's medication profile  Outcome: Progressing     Problem: GENITOURINARY - ADULT  Goal: Absence of urinary retention  Desc

## 2022-01-23 NOTE — PROGRESS NOTES
Pt transferred to Mother Baby room 2207 in stable condition. Report given to [facility]  . Infant transferred with mother in stable condition.

## 2022-01-24 PROBLEM — Z98.891 STATUS POST PRIMARY LOW TRANSVERSE CESAREAN SECTION: Status: ACTIVE | Noted: 2022-01-23

## 2022-01-24 LAB
BASOPHILS # BLD AUTO: 0.01 X10(3) UL (ref 0–0.2)
BASOPHILS NFR BLD AUTO: 0.1 %
EOSINOPHIL # BLD AUTO: 0.02 X10(3) UL (ref 0–0.7)
EOSINOPHIL NFR BLD AUTO: 0.2 %
ERYTHROCYTE [DISTWIDTH] IN BLOOD BY AUTOMATED COUNT: 14.6 %
HCT VFR BLD AUTO: 28.7 %
HGB BLD-MCNC: 9.6 G/DL
IMM GRANULOCYTES # BLD AUTO: 0.07 X10(3) UL (ref 0–1)
IMM GRANULOCYTES NFR BLD: 0.7 %
LYMPHOCYTES # BLD AUTO: 1.66 X10(3) UL (ref 1–4)
LYMPHOCYTES NFR BLD AUTO: 16.3 %
MCH RBC QN AUTO: 31.1 PG (ref 26–34)
MCHC RBC AUTO-ENTMCNC: 33.4 G/DL (ref 31–37)
MCV RBC AUTO: 92.9 FL
MONOCYTES # BLD AUTO: 1.13 X10(3) UL (ref 0.1–1)
MONOCYTES NFR BLD AUTO: 11.1 %
NEUTROPHILS # BLD AUTO: 7.3 X10 (3) UL (ref 1.5–7.7)
NEUTROPHILS # BLD AUTO: 7.3 X10(3) UL (ref 1.5–7.7)
NEUTROPHILS NFR BLD AUTO: 71.6 %
PLATELET # BLD AUTO: 159 10(3)UL (ref 150–450)
RBC # BLD AUTO: 3.09 X10(6)UL
WBC # BLD AUTO: 10.2 X10(3) UL (ref 4–11)

## 2022-01-24 NOTE — PROGRESS NOTES
OB progress note    Subjective: Pain controlled. Lochia normal. Tolerating PO. Passing flatus. Voiding freely. + ambulating.       01/24/22  0400 01/24/22  0419 01/24/22  0802 01/24/22  1003   BP:  107/46 106/56 103/55   BP Location:  Left arm Left arm Left

## 2022-01-24 NOTE — PLAN OF CARE
Problem: POSTPARTUM  Goal: Long Term Goal:Experiences normal postpartum course  Description: INTERVENTIONS:  - Assess and monitor vital signs and lab values. - Assess fundus and lochia. - Provide ice/sitz baths for perineum discomfort.   - Monitor heali pain/trauma. - Instruct and provide assistance with proper latch. - Review techniques for milk expression (breast pumping) and storage of breast milk. Provide pumping equipment/supplies, instructions and assistance, as needed.   - Encourage rooming-in and Consider collaborating with pharmacy to review patient's medication profile  - Implement strategies to promote bladder emptying  Outcome: Progressing

## 2022-01-24 NOTE — PLAN OF CARE
Problem: POSTPARTUM  Goal: Long Term Goal:Experiences normal postpartum course  Description: INTERVENTIONS:  - Assess and monitor vital signs and lab values. - Assess fundus and lochia. - Provide ice/sitz baths for perineum discomfort.   - Monitor heali pain/trauma. - Instruct and provide assistance with proper latch. - Review techniques for milk expression (breast pumping) and storage of breast milk. Provide pumping equipment/supplies, instructions and assistance, as needed.   - Encourage rooming-in and Consider collaborating with pharmacy to review patient's medication profile  - Implement strategies to promote bladder emptying  Outcome: Completed

## 2022-01-24 NOTE — PAYOR COMM NOTE
--------------  ADMISSION REVIEW     Payor: Davis Alfaro #:  [id number]  Authorization Number: [id number]    Admit date: 1/22/22  Admit time: 12:33 PM       REVIEW DOCUMENTATION:         H&P - H&P Note      H&P sign to heparin at 36 weeks. Last dose 2 days ago evening. Laura Andrea  HgB 11 on admission.   Morbid Obesity  Failure to progress  Non reassuring FHT  For C/S    Electronically signed by Girish López MD on 1/23/2022 11:32 AM         1-23-22      Marilu Carrillo (MOTRIN) tab 600 mg     Date Action Dose Route User    1/24/2022 1046 Given 600 mg Oral Joce Villarreal [facility]    1/24/2022 9623 Given 600 mg Oral Erwin Rodriguez RN      lactated ringers infusion     Date Action Dose Route User    1/23/2022 1204 New Bag (non

## 2022-01-24 NOTE — CM/SW NOTE
spoke to Selina Umana (patient) to review insurance and PCP for infant. Selina Umana would like to have infant added on to Medicaid. [facility] was called and asked to follow up with patient to do medicaid add on for infant. PCP for infant will be Dr. Ilana Cobos

## 2022-01-24 NOTE — PROGRESS NOTES
[facility] 2SW-J erick Srinivasan Patient Status:  Inpatient   Age/Gender 31year old female MRN [id number]   [facility] 2SW-J Attending [facility],  [facility] [address] Day # 2 PCP Christopher Essex, MD      Anesthesia Pain Progress No

## 2022-01-25 VITALS
WEIGHT: 237 LBS | HEIGHT: 64 IN | DIASTOLIC BLOOD PRESSURE: 63 MMHG | RESPIRATION RATE: 17 BRPM | SYSTOLIC BLOOD PRESSURE: 128 MMHG | OXYGEN SATURATION: 97 % | HEART RATE: 85 BPM | TEMPERATURE: 99 F | BODY MASS INDEX: 40.46 KG/M2

## 2022-01-25 PROBLEM — Z34.03 PREGNANCY, FIRST, THIRD TRIMESTER: Status: RESOLVED | Noted: 2022-01-21 | Resolved: 2022-01-25

## 2022-01-25 RX ORDER — IBUPROFEN 600 MG/1
600 TABLET ORAL EVERY 6 HOURS
Qty: 30 TABLET | Refills: 0 | Status: SHIPPED | OUTPATIENT
Start: 2022-01-25

## 2022-01-25 RX ORDER — GABAPENTIN 300 MG/1
300 CAPSULE ORAL EVERY 8 HOURS PRN
Qty: 20 CAPSULE | Refills: 0 | Status: SHIPPED | OUTPATIENT
Start: 2022-01-25

## 2022-01-25 RX ORDER — ENOXAPARIN SODIUM 100 MG/ML
40 INJECTION SUBCUTANEOUS DAILY
Qty: 42 EACH | Refills: 0 | Status: SHIPPED | OUTPATIENT
Start: 2022-01-26 | End: 2022-03-09

## 2022-01-25 NOTE — DISCHARGE SUMMARY
[facility]    Discharge Summary    Kristi Saleh Patient Status:  Inpatient    1995 MRN [id number]   [facility] 2SW-J Attending Sacha Rangel MD   [facility] Day # 3 PCP Dieudonne Mead MD     Date of Admission: 2022    Date o (eight) hours as needed (For breakthrough moderate pain). ibuprofen 600 MG Oral Tab  Take 1 tablet (600 mg total) by mouth every 6 (six) hours. Discharge Diet: As tolerated    Discharge Activity: As tolerated    Follow up:       Follow-up

## 2022-01-25 NOTE — PROGRESS NOTES
Discharge instructions given. Verbalized understanding. All questions answered  Hugs and Kisses removed.

## 2022-01-25 NOTE — PROGRESS NOTES
OB progress note    A/P: 31year old  now POD#2 s/p LTCS for NRFHR. H/o DVT  on anticoagulation, obesity, anemia, Congregational, GBS carrier    Afebrile, VSS  Pain controlled  Hb 11.0 -> 9.6. IV iron x 1 dose given yesterday.    H/o DVT -> ho

## 2022-01-27 ENCOUNTER — TELEPHONE (OUTPATIENT)
Dept: OBGYN UNIT | Facility: HOSPITAL | Age: 27
End: 2022-01-27

## 2022-01-27 NOTE — PROGRESS NOTES
01/27/22 1541   Cradle Call Follow up   Cradle call follow up date 01/27/22   Follow up needed Completed   Hypertension or Preeclampsia during pregnancy or delivery No   Outgoing Cradle Call completed: Mom reports that she and infant are doing well.

## 2022-02-09 ENCOUNTER — POSTPARTUM (OUTPATIENT)
Dept: OBGYN CLINIC | Facility: CLINIC | Age: 27
End: 2022-02-09
Payer: MEDICAID

## 2022-02-09 VITALS
HEIGHT: 64 IN | BODY MASS INDEX: 36.19 KG/M2 | DIASTOLIC BLOOD PRESSURE: 74 MMHG | WEIGHT: 212 LBS | HEART RATE: 85 BPM | SYSTOLIC BLOOD PRESSURE: 132 MMHG

## 2022-02-09 PROCEDURE — 3008F BODY MASS INDEX DOCD: CPT | Performed by: OBSTETRICS & GYNECOLOGY

## 2022-02-09 PROCEDURE — 3078F DIAST BP <80 MM HG: CPT | Performed by: OBSTETRICS & GYNECOLOGY

## 2022-02-09 PROCEDURE — 3075F SYST BP GE 130 - 139MM HG: CPT | Performed by: OBSTETRICS & GYNECOLOGY

## 2022-02-10 ENCOUNTER — TELEPHONE (OUTPATIENT)
Dept: OBGYN CLINIC | Facility: CLINIC | Age: 27
End: 2022-02-10

## 2022-02-10 NOTE — TELEPHONE ENCOUNTER
Received approval letter dated 1/24/22 from [address] [place] [address]  CPT 18897   Dates 1/22/22-1/26/22 approved

## 2022-02-10 NOTE — PROGRESS NOTES
Sneha eLe is status post  section. 2 weeks. .  No drainage or erythema. Steri-Strips are off. Incision dry and intact. 1 small area of 2 mm where the inferior and superior part of incision is not completely approximated. No drainage. Should approximate.   Return to office in 4 weeks

## 2022-03-23 ENCOUNTER — POSTPARTUM (OUTPATIENT)
Dept: OBGYN CLINIC | Facility: CLINIC | Age: 27
End: 2022-03-23
Payer: MEDICAID

## 2022-03-23 VITALS
HEART RATE: 73 BPM | WEIGHT: 202.38 LBS | SYSTOLIC BLOOD PRESSURE: 125 MMHG | BODY MASS INDEX: 34.55 KG/M2 | DIASTOLIC BLOOD PRESSURE: 85 MMHG | HEIGHT: 64 IN

## 2022-03-23 PROCEDURE — 3079F DIAST BP 80-89 MM HG: CPT | Performed by: OBSTETRICS & GYNECOLOGY

## 2022-03-23 PROCEDURE — 3008F BODY MASS INDEX DOCD: CPT | Performed by: OBSTETRICS & GYNECOLOGY

## 2022-03-23 PROCEDURE — 3074F SYST BP LT 130 MM HG: CPT | Performed by: OBSTETRICS & GYNECOLOGY

## (undated) DEVICE — LARGE, DISPOSABLE ALEXIS O C-SECTION PROTECTOR - RETRACTOR: Brand: ALEXIS ® O C-SECTION PROTECTOR - RETRACTOR

## (undated) DEVICE — ABSORBABLE HEMOSTAT (OXIDIZED REGENERATED CELLULOSE, U.S.P.): Brand: SURGICEL

## (undated) DEVICE — TRAXI PANNICULUS RETRACTOR WITH RETENTUS TECHNOLOGY (BMI 30-50): Brand: TRAXI® PANNICULUS RETRACTOR

## (undated) NOTE — LETTER
01/10/22          To Whom This May Concern,    Please be advised Ellen Esparza is a patient under my care for pregnancy. She is to be off from work , Tuesday, January 11,2022.       Should you have any questions or concerns, please feel free to contact my of